# Patient Record
Sex: FEMALE | Race: BLACK OR AFRICAN AMERICAN | Employment: FULL TIME | URBAN - METROPOLITAN AREA
[De-identification: names, ages, dates, MRNs, and addresses within clinical notes are randomized per-mention and may not be internally consistent; named-entity substitution may affect disease eponyms.]

---

## 2017-11-15 ENCOUNTER — OFFICE VISIT (OUTPATIENT)
Dept: FAMILY MEDICINE CLINIC | Age: 32
End: 2017-11-15

## 2017-11-15 VITALS
SYSTOLIC BLOOD PRESSURE: 125 MMHG | TEMPERATURE: 99 F | HEART RATE: 98 BPM | WEIGHT: 226 LBS | BODY MASS INDEX: 36.48 KG/M2 | OXYGEN SATURATION: 98 % | DIASTOLIC BLOOD PRESSURE: 82 MMHG

## 2017-11-15 DIAGNOSIS — T78.40XA ALLERGIC REACTION, INITIAL ENCOUNTER: Primary | ICD-10-CM

## 2017-11-15 DIAGNOSIS — L50.9 URTICARIA: ICD-10-CM

## 2017-11-15 RX ORDER — MUPIROCIN 20 MG/G
OINTMENT TOPICAL
COMMUNITY
Start: 2017-11-08

## 2017-11-15 RX ORDER — SULFAMETHOXAZOLE AND TRIMETHOPRIM 800; 160 MG/1; MG/1
TABLET ORAL
COMMUNITY
Start: 2017-11-06 | End: 2017-11-15

## 2017-11-15 NOTE — PROGRESS NOTES
[facility] [place]  Poncho Mccauley is a 34 y.o. female who presents with a rash that she only noticed this morning. Upon reflection noticed that her flanks were itchy yesterday. Woke up feeling vaguely itchy and when she went to work where the lighting was better noticed that she had an urticarial rash all over her body. As far she knows she has not made any changes in her lifestyle or exposures. No unusual foods. [facility] and [facility] B[facility]rim for an abscess. The abscess burst yesterday and she has a day and half left on her Bactrim (10 day supply). Has never been exposed to Bactrim before    PMHx:  Past Medical History:   Diagnosis Date    Abdominal pain     Bloating     Calculus of kidney        Meds:   Current Outpatient Prescriptions   Medication Sig Dispense Refill    mupirocin (BACTROBAN) 2 % ointment       cyclobenzaprine (FLEXERIL) 10 mg tablet Take 1 Tab by mouth three (3) times daily as needed for Muscle Spasm(s). 20 Tab 0    naproxen (NAPROSYN) 500 mg tablet Take 1 Tab by mouth every twelve (12) hours as needed for Pain. 20 Tab 0       Allergies: Allergies   Allergen Reactions    Bactrim [Sulfamethoxazole-Trimethoprim] Rash    Codeine Other (comments)     vomiting       Smoker:  History   Smoking Status    Current Every Day Smoker    Packs/day: 0.50   Smokeless Tobacco    Never Used       ETOH:   History   Alcohol Use    Yes     Comment: 1-2weeks       FH:   Family History   Problem Relation Age of Onset    Hypertension Maternal Grandmother     Diabetes Maternal Grandmother     Hypertension Paternal Grandmother        ROS:   As listed in [facility] [place].  In addition:  Constitutional:   No headache, fever, fatigue, weight loss or weight gain      Cardiac:    No chest pain      Resp:   No cough or shortness of breath      Neuro   No loss of consciousness, dizziness, seizures      Physical Exam:  Blood pressure 125/82, pulse 98, temperature 99 °F (37.2 °C), weight 226 lb (102.5 kg), SpO2 98 %. GEN: No apparent distress. Alert and oriented and responds to all questions appropriately. NEUROLOGIC:  No focal neurologic deficits. Strength and sensation grossly intact. Coordination and gait grossly intact. EXT: Well perfused. No edema. SKIN:  generalized urticarial rash on face, neck, chest, arms, back. Legs not examined but she thinks it is there as well  Lungs clear to auscultation bilaterally  HEENT, normal tongue size, no pharyngeal erythema or edema. No angioedema       Assessment and Plan     Rash, likely allergic reaction  Context clues suggest allergy to Bactrim, has never been exposed before, about 8 days into the medication developed rash. Airway is not involved Zyrtec, Zantac, moisturizing lotion    Take these basic steps because she is doing a great job not scratching at the rash and is not particularly bothered by it other than wondering what could be the cause. Stop Bactrim, added to allergy list    Warned her that it might get a little worse over the next few days and told her to be wary of any signs around the mouth or airway. If this happens she is to call us immediately and get a prescription for prednisone. If she has a recurrence of boil in future come in and might be more amenable to lancing than antibiotic      ICD-10-CM ICD-9-CM    1. Allergic reaction, initial encounter T78.40XA 995.3    2. Urticaria L50.9 708.9        AVS given.  Pt expressed understanding of instructions

## 2021-02-22 ENCOUNTER — OFFICE VISIT (OUTPATIENT)
Dept: URGENT CARE | Age: 36
End: 2021-02-22
Payer: MEDICAID

## 2021-02-22 VITALS — HEART RATE: 80 BPM | RESPIRATION RATE: 14 BRPM | OXYGEN SATURATION: 98 % | TEMPERATURE: 98.9 F

## 2021-02-22 DIAGNOSIS — Z20.822 EXPOSURE TO COVID-19 VIRUS: Primary | ICD-10-CM

## 2021-02-22 PROCEDURE — 99202 OFFICE O/P NEW SF 15 MIN: CPT | Performed by: FAMILY MEDICINE

## 2021-02-22 NOTE — PROGRESS NOTES
This patient was seen at [address] [place] [address] [facility]gent [facility] while in their vehicle due to COVID-19 pandemic with PPE and focused examination in order to decrease community viral transmission. The patient/guardian gave verbal consent to treat. The history is provided by the patient. Asymptomatic  Exposure from friend of her daughter with covid- positive      Past Medical History:   Diagnosis Date    Abdominal pain     Bloating     Calculus of kidney         History reviewed. No pertinent surgical history. Family History   Problem Relation Age of Onset    Hypertension Maternal Grandmother     Diabetes Maternal Grandmother     Hypertension Paternal Grandmother         Social History     Socioeconomic History    Marital status: LEGALLY      Spouse name: Not on file    Number of children: Not on file    Years of education: Not on file    Highest education level: Not on file   Occupational History    Not on file   Social Needs    Financial resource strain: Not on file    Food insecurity     Worry: Not on file     Inability: Not on file   [employer] needs     Medical: Not on file     Non-medical: Not on file   Tobacco Use    Smoking status: Current Every Day Smoker     Packs/day: 0.50    Smokeless tobacco: Never Used   Substance and Sexual Activity    Alcohol use: Yes     Comment: 1-2weeks    Drug use: No    Sexual activity: Yes     Partners: Male     Birth control/protection: I.U.D.    Lifestyle    Physical activity     Days per week: Not on file     Minutes per session: Not on file    Stress: Not on file   Relationships    Social connections     Talks on phone: Not on file     Gets together: Not on file     Attends Cheondo[employer] service: Not on file     Active member of club or organization: Not on file     Attends meetings of clubs or organizations: Not on file     Relationship status: Not on file    Intimate partner violence     Fear of current or ex partner: Not on file Emotionally abused: Not on file     Physically abused: Not on file     Forced sexual activity: Not on file   Other Topics Concern    Not on file   Social History Narrative    Not on file                ALLERGIES: Bactrim [sulfamethoxazole-trimethoprim] and Codeine    Review of Systems   All other systems reviewed and are negative. Vitals:    02/22/21 1706   Pulse: 80   Resp: 14   Temp: 98.9 °F (37.2 °C)   SpO2: 98%       Physical Exam  Vitals signs and nursing note reviewed. Constitutional:       General: She is not in acute distress. Appearance: She is not ill-appearing. Pulmonary:      Effort: Pulmonary effort is normal. No respiratory distress. Breath sounds: No wheezing. Genitourinary:     Rectum: Normal.         MDM    Procedures        ICD-10-CM ICD-9-CM    1. Exposure to COVID-19 virus  Z20.822 V01.79 NOVEL CORONAVIRUS (COVID-19)     No orders of the defined types were placed in this encounter. No results found for any visits on 02/22/21. The patients condition was discussed with the patient and they understand. The patient is to follow up with primary care doctor. If signs and symptoms become worse the pt is to go to the ER. The patient is to take medications as prescribed.

## 2021-02-24 LAB — SARS-COV-2, NAA: NOT DETECTED

## 2021-03-01 ENCOUNTER — OFFICE VISIT (OUTPATIENT)
Dept: URGENT CARE | Age: 36
End: 2021-03-01
Payer: MEDICAID

## 2021-03-01 VITALS — HEART RATE: 70 BPM | TEMPERATURE: 99.2 F | OXYGEN SATURATION: 99 % | RESPIRATION RATE: 14 BRPM

## 2021-03-01 DIAGNOSIS — Z20.822 EXPOSURE TO COVID-19 VIRUS: Primary | ICD-10-CM

## 2021-03-01 PROCEDURE — 99212 OFFICE O/P EST SF 10 MIN: CPT | Performed by: NURSE PRACTITIONER

## 2021-03-01 NOTE — PROGRESS NOTES
Subjective: (As above and below)       This patient was seen in [facility] at [address] [place] [address] [facility]gent [facility] while in their vehicle due to COVID-19 pandemic with PPE and focused examination in order to decrease community viral transmission. The patient/guardian gave verbal consent to treat. Chief Complaint   Patient presents with    Covid Testing     Pt wants to be re-tested for COVID 19. Fili Sneed is a 38 y.o. female who presents for COVID-19 Exposure and testing. Tested negative for covid 19 approx 1 week ago  Known contact with: covid 19 positive family member in home. Feels well and denies any symptoms at this point in time. Review of Systems - negative except as listed above    Reviewed PmHx, RxHx, FmHx, SocHx, AllgHx and updated in chart. Family History   Problem Relation Age of Onset    Hypertension Maternal Grandmother     Diabetes Maternal Grandmother     Hypertension Paternal Grandmother        Past Medical History:   Diagnosis Date    Abdominal pain     Bloating     Calculus of kidney       Social History     Socioeconomic History    Marital status: LEGALLY      Spouse name: Not on file    Number of children: Not on file    Years of education: Not on file    Highest education level: Not on file   Tobacco Use    Smoking status: Current Every Day Smoker     Packs/day: 0.50    Smokeless tobacco: Never Used   Substance and Sexual Activity    Alcohol use: Yes     Comment: 1-2weeks    Drug use: No    Sexual activity: Yes     Partners: Male     Birth control/protection: I.U.D. Current Outpatient Medications   Medication Sig    mupirocin (BACTROBAN) 2 % ointment     cyclobenzaprine (FLEXERIL) 10 mg tablet Take 1 Tab by mouth three (3) times daily as needed for Muscle Spasm(s).  naproxen (NAPROSYN) 500 mg tablet Take 1 Tab by mouth every twelve (12) hours as needed for Pain.      No current facility-administered medications for this visit. Objective:     Vitals:    03/01/21 1331   Pulse: 70   Resp: 14   Temp: 99.2 °F (37.3 °C)   SpO2: 99%       Physical Exam  General appearance  appears well hydrated and does not appear toxic, no acute distress  Eyes - EOMs intact. Non injected. Ears - no external swelling  Neck/Lymphatics  no obvious swelling  Chest - normal breathing effort. No active cough heard. No audible wheezing. Heart - HR-see vitals  Skin - no observable rashes or pallor  Neurologic- alert and oriented x 3  Psychiatric- normal mood, behavior and though content. Assessment/ Plan:     1. Exposure to COVID-19 virus    - NOVEL CORONAVIRUS (COVID-19)      Will test for COVID-19 given exposure  Supportive home care for any development of mild symptoms - tylenol, maintain adequate fluid intake, deep breathing exercises  Self isolate/quarantine advised based on recommendations in current CDC guidelines. Follow up: We have reviewed, in detail, particular presentations/worsening/concerning signs and symptoms that may warrant seeking immediate care in the ED setting and patient verbalized being aware of what to watch for. For other non-severe changes, non-improvement or questions, patient aware to contact PCP office or consider a virtual online medical consultation.         Tessa Chery NP

## 2021-03-03 LAB — SARS-COV-2, NAA: NOT DETECTED

## 2023-05-11 RX ORDER — NAPROXEN 500 MG/1
TABLET ORAL
COMMUNITY
Start: 2016-10-13

## 2023-05-11 RX ORDER — CYCLOBENZAPRINE HCL 10 MG
TABLET ORAL 3 TIMES DAILY PRN
COMMUNITY
Start: 2016-10-13